# Patient Record
Sex: MALE | Race: WHITE
[De-identification: names, ages, dates, MRNs, and addresses within clinical notes are randomized per-mention and may not be internally consistent; named-entity substitution may affect disease eponyms.]

---

## 2017-08-24 ENCOUNTER — HOSPITAL ENCOUNTER (OUTPATIENT)
Dept: HOSPITAL 84 - D.CATH | Age: 72
Discharge: HOME | End: 2017-08-24
Attending: INTERNAL MEDICINE
Payer: MEDICARE

## 2017-08-24 VITALS — WEIGHT: 185.39 LBS | BODY MASS INDEX: 29.79 KG/M2 | HEIGHT: 66 IN

## 2017-08-24 VITALS — SYSTOLIC BLOOD PRESSURE: 117 MMHG | DIASTOLIC BLOOD PRESSURE: 61 MMHG

## 2017-08-24 DIAGNOSIS — I10: ICD-10-CM

## 2017-08-24 DIAGNOSIS — I25.10: Primary | ICD-10-CM

## 2017-08-24 DIAGNOSIS — R94.30: ICD-10-CM

## 2017-08-24 DIAGNOSIS — F17.200: ICD-10-CM

## 2017-08-24 DIAGNOSIS — Z01.812: ICD-10-CM

## 2017-08-24 LAB
ANION GAP SERPL CALC-SCNC: 13.3 MMOL/L (ref 8–16)
BASOPHILS NFR BLD AUTO: 0.5 % (ref 0–2)
BUN SERPL-MCNC: 18 MG/DL (ref 7–18)
CALCIUM SERPL-MCNC: 9 MG/DL (ref 8.5–10.1)
CHLORIDE SERPL-SCNC: 102 MMOL/L (ref 98–107)
CO2 SERPL-SCNC: 26.7 MMOL/L (ref 21–32)
CREAT SERPL-MCNC: 0.9 MG/DL (ref 0.6–1.3)
EOSINOPHIL NFR BLD: 2.7 % (ref 0–7)
ERYTHROCYTE [DISTWIDTH] IN BLOOD BY AUTOMATED COUNT: 12.7 % (ref 11.5–14.5)
GLUCOSE SERPL-MCNC: 112 MG/DL (ref 74–106)
HCT VFR BLD CALC: 42.7 % (ref 42–54)
HGB BLD-MCNC: 14.4 G/DL (ref 13.5–17.5)
IMM GRANULOCYTES NFR BLD: 0.1 % (ref 0–5)
LYMPHOCYTES NFR BLD AUTO: 28.5 % (ref 15–50)
MCH RBC QN AUTO: 30.9 PG (ref 26–34)
MCHC RBC AUTO-ENTMCNC: 33.7 G/DL (ref 31–37)
MCV RBC: 91.6 FL (ref 80–100)
MONOCYTES NFR BLD: 9.7 % (ref 2–11)
NEUTROPHILS NFR BLD AUTO: 58.5 % (ref 40–80)
OSMOLALITY SERPL CALC.SUM OF ELEC: 278 MOSM/KG (ref 275–300)
PLATELET # BLD: 144 10X3/UL (ref 130–400)
PMV BLD AUTO: 12.4 FL (ref 7.4–10.4)
POTASSIUM SERPL-SCNC: 4 MMOL/L (ref 3.5–5.1)
RBC # BLD AUTO: 4.66 10X6/UL (ref 4.2–6.1)
SODIUM SERPL-SCNC: 138 MMOL/L (ref 136–145)
WBC # BLD AUTO: 7.7 10X3/UL (ref 4.8–10.8)

## 2017-08-24 NOTE — NUR
1440 2X2 AND TEGADERM REMAIN CDI TO RIGHT WRIST WITH NO BLEEDING OR
HEMATOMA NOTED. CAP REFILL TO FINGERS IS BRISK, FINGERS WARM AND
PULSES PRESENT. IV DC'D WITH CATH INTACT. REVIEWED DC INSTRUCTIONS
WTIH PT WHO VERBALIZES UNDERSTANDING. AWAITING PT'S WIFE'S RETURN FOR
DC.

## 2017-08-24 NOTE — NUR
1145 ATTEMPTED TR BAND DEFLATION, OOZING STARTED WTIH LESS THAN 1 CC
OF AIR REMOVED. AIR RE-INSTILLED WITH BLEEDING STOPPED. WILL CONTINUE
TO MONITOR.

## 2017-08-24 NOTE — NUR
1510 PT'S WIFE HAS RETURNED. PT DRESSED FOR DC TO HOME, HAS AMBULATED
TO THE BATHROOM AND VOIDED QS. PT ESCORTED TO PRIVATE AUTO VIA WC BY
STAFF WITH WIFE DRIVING HIM HOME.

## 2017-08-24 NOTE — NUR
1040 RECEIVED PT FROM CATH LAB, PT IS DROWSY, DENIES ANY C/O CHEST
PAIN OR NAUSEA. TR BAND CDI TO RIGHT WRIST. RIGHT HAND IS WARM, CAP
REFILL IS BRISK. WIFE AT BEDSIDE, CALL LIGHT IN REACH.

## 2017-08-24 NOTE — NUR
1055 PT SLEEPING, AWKAKENS TO VERBAL STIMULI, DENIES ANY C/O. TR BAND
WITH NO BLEEDING OR HEMATOMA NOTED. FINGERS WARM, CAP REFILL IS
BRISK. FAMILY AT BEDSIDE, CALL IGHT IN REACH.

## 2017-08-24 NOTE — NUR
1415 ALL AIR REMOVED FROM TR BAND AND NO BLEEDING OR HEMATOMA NOTED.
FINGERS WARM AND CAP REFILL IS BRISK. PT DENIES ANY C/O AT THIS TIME.

## 2017-08-24 NOTE — NUR
1315 2 CC OF AIR REMOVED FROM TR BAND WITH NO BLEEDING OR HEMATOMA
NOTED. PT DENIES ANY C/O. HAS TASHI PO FLUIDS AND SANDWICH. WIFE AT
BEDSIDE, CALL LIGHT IN REACH.

## 2017-08-24 NOTE — NUR
1115 PT DENIES ANY C/O. TR BAND IS CDI, NO BLEEDING OR HEMATOMA
NOTED. DENIES ANY C/O. WIFE AT BEDSIDE.

## 2017-08-24 NOTE — NUR
1215 ATTEMPTING TR BAND DEFLATION WITH 2 CC OF AIR REMOVED AND SITE
OOZING AGAIN. 2 CC OF AIR RE-INSTILLED AND OOZING STOPPED. WILL
CONTINUE TO MONITOR.
WIFE AT BEDSIDE. PT DENEIS ANY C/O AT THIS TIME.

## 2017-08-24 NOTE — HEMODYNAMI
PATIENT:KAYLEE GREENWOOD JR                             MEDICAL RECORD: [id number]
: 47                                            LOCATION:D.CAT          
ACCT# [id number]                                       ADMISSION DATE: 17
 
 
 Generatedon:201710:30
Patient name: KAYLEE GREENWOOD Patient #: [id number] Visit #: [id number] SSN: 5
65- :
1945 Date of study: 2017
Page: Of
Hemodynamic Procedure Report
****************************
Patient Data
Patient Demographics
Procedure consent was obtained
First Name: KAYLEE        Gender: Male
Last Name: KRYSTYNA           Suffix: Jr
Middle Initial: O           : 1945
Patient #: [id number]       Age: 72 year(s)
Visit #: [id number]       Race: 
SSN: 
Accession #:
[id number]
Additional ID: [id number]
Contact details
Address: [address] [place] [address]     Phone: [phone]
point
State: [place]
City: [place]
Zip code: [address]
Past Medical History
Allergies
Allergen        Reaction        Date         Comments
Reported
Penicillins                     2016
Statins                         2016
Sulfa drugs                     2016
Other allergy                   2017    PCN, Sulfa, Statins
Admission
Admission Data
Admission Date: 2017   Admission Time: 7:08
Arrival Date: 2017     Arrival Time: 9:00
Admit Source: Other         Insurance Payor: Medicare
Height (in.): 66            BSA: 1.92 (m2)
Height (cm.): 167.64        BMI: 29.38 (kg/m2)
Weight (lbs.): 182
Weight (kg.): 82.55
Lab Results
Lab Result Date: 2017  Lab Result Time: 0:00
Biochemistry
Name         Units    Result                Min      Max
BUN          mg/dl    18       --(---*)--   7        18
Creatinine   mg/dl    0.9      --(-*--)--   0.6      1.3
CBC
Name         Units    Result                Min      Max
Hemoglobin   g/dl     14.4     --(*---)--   13.5     17.5
Procedure
Procedure Types
Cath Procedure
 
Diagnostic Procedure
C
LHC w/Coronaries
Miscellaneous Procedures
Moderate Sedation up to 30 minutes
Procedure Description
Procedure Date
Procedure Date: 2017
Procedure Start Time: 10:17
Procedure End Time: 10:28
Procedure Staff
Name                            Function
Andrew Smyth MD                   Performing Physician
Melinda Nichols RT               Scrub
Kenton Alfaro RN                  Nurse
Brooke Bernardo RT                    Monitor
Procedure Data
Cath Procedure
Fluoroscopy
Diagnostic fluoroscopy      Total fluoroscopy Time: 2.1
time: 2.1 min               min
Diagnostic fluoroscopy      Total fluoroscopy dose: 407
dose: 407 mGy               mGy
Contrast Material
Contrast Material Type                       Amount (ml)
Isovue 300                                   42
Entry Location
Entry     Primary  Successful  Side  Size  Upsize Upsize Entry    Closure     Lorenzo
ccessful  Closure
Location                             (Fr)  1 (Fr) 2 (Fr) Remarks  Device        
          Remarks
Radial                         Right 6 Fr                         Mechanical
artery                               Short                        Compression
Estimated blood loss: 5 ml
Diagnostic catheters
Device Type               Used For           End Catheter
Placement
Terumo 5Fr Jose J 110cm    Multi-vessel
catheter                  Angiography
Procedure Complications
No complications
Procedure Medications
Medication           Administration Route Dosage
Oxygen               NC                   2 l/min
Lidocaine 2%         added to field       20
Heparin Flush Bag    added to field       2 bags
(1000units/500ml NS)
0.9% NaCl            I.V.                 100 ml/hr
Versed               I.V.                 1 mg
Fentanyl             I.V.                 50 mcg
Versed               I.V.                 0.5 mg
Fentanyl             I.V.                 25 mcg
Radial Cocktail      I.A.                 1 syringe
(Verapomil 2mg/Nitro
400mcg/Heparin
1500units)
Hemodynamics
Rest
BSA: 1.92 (m2) HGB: 14.4 (g/dl) O2 Consumption: Estimated: 229.96 (ml/min) O2 Co
nsumption indexed:
 
Estimated:119.77 (ml/min/m) Heart Rate: 81 (bpm)
Pressure Samples
Time     Site     Value (mmHg) Purpose      Heart      Use
Rate(bpm)
10:22    LV       87/-10,-3    Snapshot     99
10:23    AO       64/42(49)    Pullback     89
10:23    LV       93/-9,-1     Pullback     89
Gradients
Valve  Time  Site 1   Site 2    Mean    SEP/DFP    Peak To Heart  Use
(mmHg)  (sec/min)  Peak    Rate
(mmHg)  (bpm)
Aortic 10:23 LV       AO        8       21         29      89
93/-9,-1 64/42(49)
Calculations
Valve    P-P      Mean      Valve     Index  Valve    Source
Name              Gradient  Area             Flow
(cm2)
Aortic   29       8
29       8
Snapshots
Pre Cath      Intra         NCS           Post Cath
Vital Signs
Time     Heart  Resp   SPO2 NIBP (mmHg) Rhythm  Pain    Sedation
Rate   (ipm)  (%)                      Status  Level
(bpm)
9:49:28  75     17     100  119/68(109) NSR     0 (11)  10(A)
, No
pain
9:53:40  77     14     99   117/66(92)  NSR     0 (11)  10(A)
, No
pain
9:57:50  76     13     98   97/62(78)   NSR     0 (11)  10(A)
, No
pain
10:01:56 78     13     97   93/56(70)   NSR     0 (11)  10(A)
, No
pain
10:06:01 74     15     98   98/55(75)   NSR     0 (11)  10(A)
, No
pain
10:10:07 73     13     99   89/59(73)   NSR     0 (11)  10(A)
, No
pain
10:14:11 76     13     98   99/56(77)   NSR     0 (11)  9(A)
, No
pain
10:18:19 73     13     98   98/55(75)   NSR     0 (11)  9(A)
, No
pain
10:22:26 83     14     98   75/44(58)   NSR     0 (11)  9(A)
, No
pain
10:26:24 80     15     97   89/60(74)   NSR     0 (11)  10(A)
, No
pain
Medications
Time     Medication       Route  Dose    Verified Delivered Reason       Notes  
Effectiveness
by       by
9:46:38  Oxygen           NC     2 l/min Andrew     Buffie    used for
 
Haja Alfaro RN   procedure
9:46:58  Lidocaine 2%     added  20ml    Andrew     Andrew      for local
to     vial    Haja Smyth MD  anesthetic
field
9:47:04  Heparin Flush    added  2 bags  Andrew     Andrew      used for
Bag              to             Haja Smyth MD  procedure
(1000units/500ml field
NS)
9:47:14  0.9% NaCl        I.V.   100     Andrew     Buffie    Per
ml/hr   Haja Alfaro RN   physician
10:08:18 Versed           I.V.   1 mg    Andrew     Buffie    for sedation
Haja Alfaro RN
10:08:23 Fentanyl         I.V.   50 mcg  Andrew     Buffie    for sedation
Haja Alfaro RN
10:16:44 Versed           I.V.   0.5 mg  Andrew     Buffie    for sedation
Haja Alfaro RN
10:16:49 Fentanyl         I.V.   25 mcg  Andrew kapoor sedation
Haja Alfaro RN
10:20:16 Radial Cocktail  I.A.   1       Andrew Morales      for
(Verapomil              syringe Haja Smyth MD  vasodilation
2mg/Nitro
400mcg/Heparin
1500units)
Procedure Log
Time     Note
9:25:44  Kenton Alfaro RN sent for patient. Start room use.
9:34:40  Informed consent obtained and on chart
9:36:11  Patient Height : 167.64 cm
9:36:14  Patient Weight : 82.55 kg
9:36:14  Admit Source: Other
9:36:25  Arrival Date: 2017 9:00:00 AM
9:36:41  Insurance Payor : Medicare
9:38:06  Diagnostic Cath Status : Elective
9:38:51  Time tracking: Regular hours
9:38:54  Plan of Care:Hemodynamics will remain stable., Cardiac
rhythm will remain stable., Comfort level will be
maintained., Respiratory function will remain
adequate., Patient/ family verbilizes understanding of
procedure., Procedure tolerated without complication.,
Recovers from procedure without complications..
9:38:59  Patient received from Pre/Post Procedure Room to CCL 2
Alert and oriented. Tansferred to table in Supine
position.
9:39:00  Warm blankets applied, and karin hugger turned on for
patient comfort.
9:39:00  Correct patient and procedure confirmed by team.
9:39:00  ECG and BP/O2 sat monitors applied to patient.
9:46:38  Oxygen 2 l/min NC was administered by Kenton Alfaro RN;
used for procedure;
9:46:58  Lidocaine 2% 20ml vial added to field was administered
by Andrew Smyth MD; for local anesthetic;
9:47:04  Heparin Flush Bag (1000units/500ml NS) 2 bags added to
field was administered by Andrew Smyth MD; used for
procedure;
9:47:14  0.9% NaCl 100 ml/hr I.V. was administered by Kenton Alfaro RN; Per physician;
9:47:20  Vital chart was started
9:50:28  Baseline sample Acquired.
9:50:29  Full Disclosure recording started
9:50:43  H&P Date Dictated: 2017 Within 30 days and on
 
chart., H&P Addendum completed by physician on day of
procedure. (MUST COMPLETE FOR ALL OUTPATIENTS).
9:50:44  Pre-procedure instructions explained to patient.
9:50:47  Family in waiting room.
9:50:49  Patient NPO since Midnight.
9:51:17  Patient allergic to Other allergyPCN, Sulfa, Statins
9:51:21  Is the patient allergic to Iodine/contrast media? No.
9:51:30  Is patient on blood thinner?No
9:51:34  Patient diabetic? No.
9:51:40  Snore? No
9:51:43  Sleep apnea? No
9:51:49  Airway obstruction? Yes COPD
9:51:53  Dentures? No ?
9:52:06  IV patent on arrival in left forearm with 0.9% NaCl at
[facility].
9:52:13  Lab results completed and on chart.
9:52:16  Right Radial & Right Groin area was prepped with
chlora-prep and draped in sterile fashion
9:52:17  Alarms reviewed by R. N.
9:52:18  Sharps counted by scrub and verified by R.N.
9:52:19  Physician paged
9:56:07  Baseline sample Acquired.
9:56:13  Rhythm: sinus rhythm , w/ ST elevation
9:56:20  Was the patient premedicated? No
9:56:26  Previous problem with sedation/anesthesia? No ?
9:56:29  Deviated septum? No
9:56:30  Opens mouth fully? Yes
9:56:31  Sticks out tongue? Yes
9:56:34  Pre procedure: right dorsailis pedis pulse 1+
Palpable, but thready & weak; easily obliterated
9:56:36  Modified Gopal's test Radial < 7 seconds
9:56:38  Patient pain scale 0/10 ?.
9:58:38  Lab Result : BUN 18 mg/dl
9:58:38  Lab Result : Creatinine 0.9 mg/dl
9:58:38  Lab Result : Hemoglobin 14.4 g/dl
9:58:42  Physician arrived
9:58:42  --------ALL STOP TIME OUT------
9:58:43  Final Timeout: patient, procedure, and site verified
with staff and physician. All members of the team are
in agreement.
9:58:46  Right Radial & Right Groin site verified by team.
9:58:48  Physical assessment completed. ASA score P 2 - A
patient with mild systemic disease as per Andrew Smyth MD.
9:58:52  Sedation plan: IV Moderate Sedation Versed, Fentanyl
9:59:12  Use device set Radial Dx
9:59:13  Acist Syringe opened to sterile field.
9:59:14  Medline Cath Pack opened to sterile field.
9:59:14  Bag Decanter opened to sterile field.
9:59:14  Terumo 6Fr Slender Glidesheath opened to sterile
field.
9:59:15  St Ian 260cm J .035 wire opened to sterile field.
9:59:15  Acist Hand Control opened to sterile field.
9:59:15  Acist Manifold opened to sterile field.
9:59:16  Tegaderm 4 x 4 opened to sterile field.
9:59:16  MBrace Wrist Support opened to sterile field.
10:07:42 Zero performed for pressure channel P1
10:08:18 Versed 1 mg I.V. was administered by Kenton Alfaro RN;
for sedation;
10:08:23 Fentanyl 50 mcg I.V. was administered by Kenton Alfaro
 
RN; for sedation;
10:16:44 Versed 0.5 mg I.V. was administered by Kenton Alfaro RN;
for sedation;
10:16:49 Fentanyl 25 mcg I.V. was administered by Kenton Alfaro
RN; for sedation;
10:17:21 Procedure started.
10:17:25 Local anesthetic to right radial artery with Lidocaine
2% by Andrew Smyth MD.**INITIAL ACCESS ONLY**
10:19:06 A 6 Fr Short sheath was inserted into the Right Radial
artery
10:20:12 A Terumo 5Fr Jose J 110cm catheter was advanced over
the wire and used for Multi-vessel Angiography.
10:20:16 Radial Cocktail (Verapomil 2mg/Nitro 400mcg/Heparin
1500units) 1 syringe I.A. was administered by Andrew Smyth MD; for vasodilation;
10:20:19 Zero performed for pressure channel P1
10:22:27 LV hemodynamics recorded.
10:22:28 LV gram done using SHERIDAN
10:22:31 Injector settings: Ml/sec: 5, Volume: 15,
10:22:44 EF : 55 %
10:23:09 LCA angiography performed.
10:23:12 Injector settings: Ml/sec: 3, Volume: 6,
10:24:45 RCA angiography performed.
10:25:04 Injector settings: Ml/sec: 3, Volume: 6,
10:25:47 Catheter removed.
10:26:20 Terumo TR Band Standard opened to sterile field.
10:26:29 Sheath removed intact; hemostasis achieved with
Mechanical Compression to the Right Radial artery.
10:26:30 Procedure ended.(Physican Out)
10:26:46 Fluoroscopy time 02.10 minutes.
10::50 Flurop Dose total: 407
10:26:50 Fluoroscopy dose: 407 mGy
10:26:54 Contrast amount:Isovue 300 42ml.
10:26:56 Sharps counted by scrub and verified by R.N.
10:26:58 TR band inflated with 10cc of air.
10:27:00 Insertion/operative site no bleeding no hematoma.
10:27:35 Post right radial artery:stable
10:27:37 Post Procedure Pulses reassessed and unchanged
10:27:48 Post procedure rhythm: unchanged.
10:27:51 Estimated blood loss: 5 ml
10:27:59 Post procedure instruction explained to
patient.Patient verbalizes understanding.
10:28:00 Patient needs reinforcement of post procedure
teaching.
10:28:14 Procedure type changed to Cath procedure, Diagnostic
procedure, LHC, LHC w/Coronaries, Miscellaneous
Procedures, Moderate Sedation up to 30 minutes
10:28:15 Procedure and supply charges have been captured,
reviewed, submitted and are correct.
10:28:20 Procedure Complication : No complications
10:28:29 Vital chart was stopped
10:28:30 See physician's report for complete and final results.
10:28:35 Report given to Pre/Post Procedure Room.
10:28:38 Patient transfered to Pre/Post Procedure Room with
Stretcher.
10:28:40 Procedure ended.
10:28:40 Full Disclosure recording stopped
10:28:45 End room use (Document Last)
Device Usage
Item Name   Manufacture  Quantity  Catalog      Hospital Part    Current Minimal
 
 Lot# /
Number       Charge   Number  Stock   Stock   Serial#
Code
Acist       Acist        1         [id number]        064996   325462  [id number]  20
Syringe     Medical
System[employer]
Medline     Cardinal     1         [id number]    [id number]   20184   365105  5
Cath Pack   Health
Bag         [employer]     1         S        892538   13684   034252  5
[employer]
Terumo 6Fr  Terumo       1         [id number]   958779   271184  019485  40
Slender
Glidesheath
St Ian     St Ian      1         [id number]       534002   163197  621738  30
260cm J
.035 wire
Acist Hand  Acist        1         75742        552348   942072  176497  5
Control     [employer]
Acist       Acist        1         12435        790228   805753  473366  5
Manifold    Medical
[employer]
Tegaderm 4  3M           1         [id number]        819505   981969  689647  5
x 4
MBrace      Advanced     1         140-0250-00  869685   19234   099049  5
Wrist       Vascular
Support     Dynamics
Terumo 5Fr  Terumo       1               296678   104458  390913  5
Jose J 110cm
catheter
Terumo TR   Terumo       1         [id number]    456404   166301  175666  40
Band
Standard
Signature Audit [place]
Stage           Time        Signature      Unsigned
Intra-Procedure 2017   Brooke Bernardo
10:30:49 AM RT(R)
Signatures
Monitor : Brooke Bernardo RT   Signature :
______________________________
Date : ______________ Time :
______________
 
 
 
 
 
 
 
 
 
 
 
 
 
[place] [id number]                           
[place] [address]

## 2017-10-24 ENCOUNTER — HOSPITAL ENCOUNTER (OUTPATIENT)
Dept: HOSPITAL 84 - D.LAB | Age: 72
Discharge: HOME | End: 2017-10-24
Attending: INTERNAL MEDICINE
Payer: MEDICARE

## 2017-10-24 VITALS — BODY MASS INDEX: 29.9 KG/M2

## 2017-10-24 DIAGNOSIS — R19.7: Primary | ICD-10-CM

## 2020-01-08 ENCOUNTER — HOSPITAL ENCOUNTER (OUTPATIENT)
Dept: HOSPITAL 84 - D.HCCARDIO | Age: 75
Discharge: HOME | End: 2020-01-08
Attending: INTERNAL MEDICINE
Payer: MEDICARE

## 2020-01-08 VITALS — BODY MASS INDEX: 29.9 KG/M2

## 2020-01-08 DIAGNOSIS — I25.10: Primary | ICD-10-CM
